# Patient Record
Sex: MALE | Race: WHITE | NOT HISPANIC OR LATINO
[De-identification: names, ages, dates, MRNs, and addresses within clinical notes are randomized per-mention and may not be internally consistent; named-entity substitution may affect disease eponyms.]

---

## 2019-04-04 ENCOUNTER — APPOINTMENT (OUTPATIENT)
Dept: VASCULAR SURGERY | Facility: HOSPITAL | Age: 79
End: 2019-04-04
Payer: COMMERCIAL

## 2019-04-04 PROCEDURE — 37233: CPT | Mod: RT

## 2019-04-04 PROCEDURE — 75710 ARTERY X-RAYS ARM/LEG: CPT | Mod: 26,59

## 2019-04-04 PROCEDURE — 75710 ARTERY X-RAYS ARM/LEG: CPT | Mod: 80,RT

## 2019-04-04 PROCEDURE — 37229: CPT | Mod: RT

## 2019-04-04 PROCEDURE — 37229: CPT | Mod: 80,RT

## 2019-04-15 ENCOUNTER — APPOINTMENT (OUTPATIENT)
Dept: VASCULAR SURGERY | Facility: CLINIC | Age: 79
End: 2019-04-15
Payer: MEDICARE

## 2019-04-15 VITALS — WEIGHT: 180 LBS | HEIGHT: 69 IN | BODY MASS INDEX: 26.66 KG/M2

## 2019-04-15 DIAGNOSIS — Z80.9 FAMILY HISTORY OF MALIGNANT NEOPLASM, UNSPECIFIED: ICD-10-CM

## 2019-04-15 DIAGNOSIS — Z78.9 OTHER SPECIFIED HEALTH STATUS: ICD-10-CM

## 2019-04-15 DIAGNOSIS — Z87.39 PERSONAL HISTORY OF OTHER DISEASES OF THE MUSCULOSKELETAL SYSTEM AND CONNECTIVE TISSUE: ICD-10-CM

## 2019-04-15 PROCEDURE — 99213 OFFICE O/P EST LOW 20 MIN: CPT

## 2019-04-15 PROCEDURE — 93923 UPR/LXTR ART STDY 3+ LVLS: CPT

## 2019-04-16 PROBLEM — Z78.9 EXERCISES OCCASIONALLY: Status: ACTIVE | Noted: 2019-04-15

## 2019-04-16 PROBLEM — Z87.39 HISTORY OF FIBROMYALGIA: Status: RESOLVED | Noted: 2019-04-16 | Resolved: 2019-04-16

## 2019-04-16 PROBLEM — Z80.9 FAMILY HISTORY OF MALIGNANT NEOPLASM: Status: ACTIVE | Noted: 2019-04-15

## 2019-04-16 PROBLEM — Z78.9 NON-SMOKER: Status: ACTIVE | Noted: 2019-04-15

## 2019-04-16 RX ORDER — ASPIRIN 81 MG/1
81 TABLET, COATED ORAL
Refills: 0 | Status: ACTIVE | COMMUNITY

## 2019-04-16 NOTE — ASSESSMENT
[FreeTextEntry1] : 77 yo male s/p bilateral toe amputations for osteomyelitis. He is s/p an intervention on the right and the amp site is healed. The left side is healing despite significant arterial disease. If the wound does not continue to heal the patient will likely require a lower extremity bypass. He stopped taking aspirin and plavix as prescribed. I recommended that he begin to take Plavix 75 mg and 81 mg daily. He will follow up in several weeks.

## 2019-04-16 NOTE — PHYSICAL EXAM
[JVD] : no jugular venous distention  [Normal Breath Sounds] : Normal breath sounds [Normal Rate and Rhythm] : normal rate and rhythm [2+] : right 2+ [Ankle Swelling (On Exam)] : not present [Ankle Swelling Bilaterally] : bilaterally  [Alert] : alert [Oriented to Person] : oriented to person [Oriented to Time] : oriented to time [Oriented to Place] : oriented to place [de-identified] : Awake and Alert [FreeTextEntry1] : biphasic peroneal, pt - right [de-identified] : appropriate [de-identified] : right toe amp site healed, left great toe amp site healing

## 2019-04-16 NOTE — HISTORY OF PRESENT ILLNESS
[FreeTextEntry1] : 77 yo male well known to me. He is s/p bilateral toe amputations for osteomyelitis by Dr. Snow. He underwent bilateral lower extremity angiograms. The left leg demonstrated severe infrapopliteal disease which I was unable to cross. 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 He returns in follow up. He reports that he is doing well. He denies fever or chills. His son is performing local wound care.

## 2019-04-16 NOTE — REVIEW OF SYSTEMS
[Fever] : no fever [Chills] : no chills [Leg Claudication] : no intermittent leg claudication [Lower Ext Edema] : no extremity edema [Limb Pain] : no limb pain [Limb Swelling] : no limb swelling [Difficulty Walking] : difficulty walking

## 2019-05-06 ENCOUNTER — APPOINTMENT (OUTPATIENT)
Dept: VASCULAR SURGERY | Facility: CLINIC | Age: 79
End: 2019-05-06
Payer: MEDICARE

## 2019-05-06 DIAGNOSIS — I70.25 ATHEROSCLEROSIS OF NATIVE ARTERIES OF OTHER EXTREMITIES WITH ULCERATION: ICD-10-CM

## 2019-05-06 DIAGNOSIS — M86.9 OSTEOMYELITIS, UNSPECIFIED: ICD-10-CM

## 2019-05-06 PROCEDURE — 99213 OFFICE O/P EST LOW 20 MIN: CPT

## 2019-05-06 NOTE — PHYSICAL EXAM
[Normal Breath Sounds] : Normal breath sounds [JVD] : no jugular venous distention  [Normal Rate and Rhythm] : normal rate and rhythm [2+] : right 2+ [Ankle Swelling Bilaterally] : bilaterally  [Ankle Swelling (On Exam)] : not present [Oriented to Person] : oriented to person [Oriented to Place] : oriented to place [Alert] : alert [Oriented to Time] : oriented to time [FreeTextEntry1] : biphasic peroneal, pt - right [de-identified] : Awake and Alert [de-identified] : appropriate [de-identified] : right toe amp site healed, left great toe amp site healed

## 2019-05-06 NOTE — ASSESSMENT
[FreeTextEntry1] : 77 yo male s/p bilateral toe amputations for osteomyelitis. He is s/p an intervention on the right and the amp site is healed. The left side amputation site healed  despite significant arterial disease. He is now taking aspirin and plavix as prescribed. \par \par He has a follow up appointment with Dr. Snow later this week. \par \par He will follow up with me in several months sooner if he develops a problem.

## 2019-05-06 NOTE — CONSULT LETTER
[Dear  ___] : Dear ~NEELA, [Consult Letter:] : I had the pleasure of evaluating your patient, [unfilled]. [Consult Closing:] : Thank you very much for allowing me to participate in the care of this patient.  If you have any questions, please do not hesitate to contact me. [Please see my note below.] : Please see my note below. [FreeTextEntry2] : Omi Snow [Sincerely,] : Sincerely, [FreeTextEntry3] : Daniel

## 2019-05-06 NOTE — REVIEW OF SYSTEMS
[Chills] : no chills [Fever] : no fever [Limb Pain] : no limb pain [Leg Claudication] : intermittent leg claudication [Lower Ext Edema] : no extremity edema [Limb Swelling] : no limb swelling [Difficulty Walking] : difficulty walking

## 2019-05-06 NOTE — HISTORY OF PRESENT ILLNESS
[FreeTextEntry1] : 79 yo male well known to me. He is s/p bilateral toe amputations for osteomyelitis by Dr. Snow. He underwent bilateral lower extremity angiograms. The left leg demonstrated severe infrapopliteal disease which I was unable to cross. 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 He returns in follow up. He reports that he is doing well. He denies fever or chills. His son is performing local wound care. [de-identified] : The patient returns in follow up. He reports that he is doing well. He denies fever or chills. He reports that his amputation sites have healed. He reports that he is taking Plavix as prescribed.

## 2019-10-28 ENCOUNTER — RX RENEWAL (OUTPATIENT)
Age: 79
End: 2019-10-28

## 2019-10-28 RX ORDER — CLOPIDOGREL BISULFATE 75 MG/1
75 TABLET, FILM COATED ORAL
Qty: 30 | Refills: 5 | Status: ACTIVE | COMMUNITY
Start: 2019-10-28 | End: 1900-01-01